# Patient Record
Sex: MALE | Race: WHITE | NOT HISPANIC OR LATINO | URBAN - METROPOLITAN AREA
[De-identification: names, ages, dates, MRNs, and addresses within clinical notes are randomized per-mention and may not be internally consistent; named-entity substitution may affect disease eponyms.]

---

## 2017-12-11 ENCOUNTER — IMPORTED ENCOUNTER (OUTPATIENT)
Dept: URBAN - METROPOLITAN AREA CLINIC 75 | Facility: CLINIC | Age: 82
End: 2017-12-11

## 2017-12-11 PROCEDURE — 92015 DETERMINE REFRACTIVE STATE: CPT

## 2017-12-11 PROCEDURE — 92014 COMPRE OPH EXAM EST PT 1/>: CPT

## 2019-12-03 ENCOUNTER — IMPORTED ENCOUNTER (OUTPATIENT)
Dept: URBAN - METROPOLITAN AREA CLINIC 75 | Facility: CLINIC | Age: 84
End: 2019-12-03

## 2019-12-03 PROCEDURE — 92015 DETERMINE REFRACTIVE STATE: CPT

## 2019-12-03 PROCEDURE — 92014 COMPRE OPH EXAM EST PT 1/>: CPT

## 2020-11-11 ENCOUNTER — APPOINTMENT (RX ONLY)
Dept: URBAN - METROPOLITAN AREA CLINIC 43 | Facility: CLINIC | Age: 85
Setting detail: DERMATOLOGY
End: 2020-11-11

## 2020-11-11 VITALS — TEMPERATURE: 97.6 F

## 2020-11-11 DIAGNOSIS — D18.0 HEMANGIOMA: ICD-10-CM

## 2020-11-11 DIAGNOSIS — L57.0 ACTINIC KERATOSIS: ICD-10-CM

## 2020-11-11 DIAGNOSIS — L82.1 OTHER SEBORRHEIC KERATOSIS: ICD-10-CM

## 2020-11-11 PROBLEM — D18.01 HEMANGIOMA OF SKIN AND SUBCUTANEOUS TISSUE: Status: ACTIVE | Noted: 2020-11-11

## 2020-11-11 PROCEDURE — ? COUNSELING

## 2020-11-11 PROCEDURE — 17004 DESTROY PREMAL LESIONS 15/>: CPT

## 2020-11-11 PROCEDURE — 99213 OFFICE O/P EST LOW 20 MIN: CPT | Mod: 25

## 2020-11-11 PROCEDURE — ? LIQUID NITROGEN

## 2020-11-11 ASSESSMENT — LOCATION DETAILED DESCRIPTION DERM
LOCATION DETAILED: INFERIOR THORACIC SPINE
LOCATION DETAILED: RIGHT SUPERIOR LATERAL MALAR CHEEK
LOCATION DETAILED: RIGHT DISTAL DORSAL FOREARM
LOCATION DETAILED: LEFT CENTRAL MALAR CHEEK
LOCATION DETAILED: RIGHT INFERIOR TEMPLE
LOCATION DETAILED: RIGHT SUPERIOR FOREHEAD
LOCATION DETAILED: RIGHT CENTRAL FRONTAL SCALP
LOCATION DETAILED: LEFT LATERAL FOREHEAD
LOCATION DETAILED: LEFT LATERAL MALAR CHEEK
LOCATION DETAILED: STERNUM
LOCATION DETAILED: RIGHT PROXIMAL DORSAL FOREARM
LOCATION DETAILED: LEFT INFERIOR LATERAL FOREHEAD
LOCATION DETAILED: LEFT SUPERIOR TEMPLE
LOCATION DETAILED: LEFT SUPERIOR FOREHEAD
LOCATION DETAILED: LEFT FOREHEAD
LOCATION DETAILED: XIPHOID
LOCATION DETAILED: RIGHT INFERIOR LATERAL MALAR CHEEK
LOCATION DETAILED: RIGHT LATERAL MALAR CHEEK
LOCATION DETAILED: INFERIOR MID FOREHEAD
LOCATION DETAILED: RIGHT SUPERIOR LATERAL BUCCAL CHEEK
LOCATION DETAILED: LEFT SUPERIOR MEDIAL FOREHEAD
LOCATION DETAILED: RIGHT INFERIOR LATERAL FOREHEAD
LOCATION DETAILED: RIGHT LATERAL FOREHEAD
LOCATION DETAILED: RIGHT INFERIOR CENTRAL MALAR CHEEK
LOCATION DETAILED: RIGHT FOREHEAD

## 2020-11-11 ASSESSMENT — LOCATION SIMPLE DESCRIPTION DERM
LOCATION SIMPLE: RIGHT SCALP
LOCATION SIMPLE: INFERIOR FOREHEAD
LOCATION SIMPLE: CHEST
LOCATION SIMPLE: RIGHT TEMPLE
LOCATION SIMPLE: LEFT TEMPLE
LOCATION SIMPLE: RIGHT FOREHEAD
LOCATION SIMPLE: LEFT CHEEK
LOCATION SIMPLE: LEFT FOREHEAD
LOCATION SIMPLE: RIGHT FOREARM
LOCATION SIMPLE: ABDOMEN
LOCATION SIMPLE: RIGHT CHEEK
LOCATION SIMPLE: UPPER BACK

## 2020-11-11 ASSESSMENT — LOCATION ZONE DERM
LOCATION ZONE: TRUNK
LOCATION ZONE: SCALP
LOCATION ZONE: FACE
LOCATION ZONE: ARM

## 2020-11-11 NOTE — PROCEDURE: LIQUID NITROGEN
Duration Of Freeze Thaw-Cycle (Seconds): 3
Consent: The patient's consent was obtained including but not limited to risks of crusting, scabbing, blistering, scarring, darker or lighter pigmentary change, recurrence, incomplete removal and infection.
Post-Care Instructions: I reviewed with the patient in detail post-care instructions. Patient is to wear sunprotection, and avoid picking at any of the treated lesions. Pt may apply Vaseline to crusted or scabbing areas.
Detail Level: Detailed
Number Of Freeze-Thaw Cycles: 2 freeze-thaw cycles
Render Post-Care Instructions In Note?: no

## 2020-11-11 NOTE — PROCEDURE: MIPS QUALITY
Detail Level: Detailed
Quality 110: Preventive Care And Screening: Influenza Immunization: Influenza Immunization Administered during Influenza season
Additional Notes: COVID-19 screening questions \\nPlan: MIPS Quality.\\nWaiting for pending test results for COVID19? No\\nHave you been in close contact with anyone who has COVID19? No\\nDo you have symptoms including fever, cough, difficulty breathing, muscle aches/soreness, sore throat, or\\ndiarrhea? No\\nHave you traveled outside the [place] or to any of the following states ([place], [place], [place], [place], [place], [place]) in the last 14 days?\\nNo\\nIs there any reason you cannot wear a face mask that covers your nose and mouth for your entire visit? No

## 2021-03-17 ENCOUNTER — IMPORTED ENCOUNTER (OUTPATIENT)
Dept: URBAN - METROPOLITAN AREA CLINIC 75 | Facility: CLINIC | Age: 86
End: 2021-03-17

## 2021-03-17 PROCEDURE — 92133 CPTRZD OPH DX IMG PST SGM ON: CPT

## 2021-03-17 PROCEDURE — 92014 COMPRE OPH EXAM EST PT 1/>: CPT

## 2021-03-17 PROCEDURE — 92015 DETERMINE REFRACTIVE STATE: CPT

## 2021-12-09 ENCOUNTER — PREPPED CHART (OUTPATIENT)
Dept: URBAN - METROPOLITAN AREA CLINIC 64 | Facility: CLINIC | Age: 86
End: 2021-12-09

## 2021-12-09 PROBLEM — H35.3132 DRY AMD, INTERMEDIATE DRY STAGE: Status: STABILIZING | Noted: 2021-12-09

## 2021-12-09 PROBLEM — D31.32 CHOROIDAL NEVUS: Status: STABILIZING | Noted: 2021-12-09

## 2021-12-09 PROBLEM — H35.3132 DRY AMD, INTERMEDIATE DRY STAGE: Noted: 2021-12-09

## 2021-12-09 PROBLEM — H47.10 HISTORY OF PAPILLEDEMA: Noted: 2021-12-09

## 2021-12-09 PROBLEM — H43.813 POSTERIOR VITREOUS DETACHMENT: Noted: 2021-12-09

## 2021-12-09 PROBLEM — H47.10 PAPILLEDEMA, UNSPECIFIED: Noted: 2021-12-09

## 2021-12-09 PROBLEM — H43.813 POSTERIOR VITREOUS DETACHMENT: Status: STABILIZING | Noted: 2021-12-09

## 2021-12-09 PROBLEM — H35.81 RETINAL EDEMA: Noted: 2021-12-09

## 2021-12-09 PROBLEM — D31.32 CHOROIDAL NEVUS: Noted: 2021-12-09

## 2022-03-01 ENCOUNTER — IMPORTED ENCOUNTER (OUTPATIENT)
Dept: URBAN - METROPOLITAN AREA CLINIC 75 | Facility: CLINIC | Age: 87
End: 2022-03-01

## 2022-03-01 PROCEDURE — 99214 OFFICE O/P EST MOD 30 MIN: CPT

## 2022-03-01 PROCEDURE — 92015 DETERMINE REFRACTIVE STATE: CPT

## 2022-09-19 ASSESSMENT — VISUAL ACUITY
OS_CC: 20/70-2
OD_CC: 20/30-3

## 2022-09-19 ASSESSMENT — TONOMETRY
OS_IOP_MMHG: 13
OD_IOP_MMHG: 12

## 2022-12-14 ENCOUNTER — FOLLOW UP (OUTPATIENT)
Dept: URBAN - METROPOLITAN AREA CLINIC 64 | Facility: CLINIC | Age: 87
End: 2022-12-14

## 2022-12-14 DIAGNOSIS — D31.32: ICD-10-CM

## 2022-12-14 DIAGNOSIS — H35.3132: ICD-10-CM

## 2022-12-14 DIAGNOSIS — H53.8: ICD-10-CM

## 2022-12-14 DIAGNOSIS — Z96.1: ICD-10-CM

## 2022-12-14 DIAGNOSIS — H43.813: ICD-10-CM

## 2022-12-14 DIAGNOSIS — H47.10: ICD-10-CM

## 2022-12-14 PROCEDURE — 92134 CPTRZ OPH DX IMG PST SGM RTA: CPT

## 2022-12-14 PROCEDURE — 76512 OPH US DX B-SCAN: CPT

## 2022-12-14 PROCEDURE — 92014 COMPRE OPH EXAM EST PT 1/>: CPT

## 2022-12-14 PROCEDURE — 92202 OPSCPY EXTND ON/MAC DRAW: CPT

## 2022-12-14 ASSESSMENT — VISUAL ACUITY
OS_CC: 20/200+1
OS_PH: 20/100+1
OD_PH: 20/25-2
OD_CC: 20/30+2

## 2022-12-14 ASSESSMENT — TONOMETRY
OS_IOP_MMHG: 12
OD_IOP_MMHG: 11

## 2023-03-13 ENCOUNTER — COMPLETE EYE EXAM (OUTPATIENT)
Dept: URBAN - METROPOLITAN AREA CLINIC 42 | Facility: CLINIC | Age: 88
End: 2023-03-13

## 2023-03-13 DIAGNOSIS — D31.32: ICD-10-CM

## 2023-03-13 DIAGNOSIS — H53.8: ICD-10-CM

## 2023-03-13 DIAGNOSIS — H35.3132: ICD-10-CM

## 2023-03-13 DIAGNOSIS — H47.10: ICD-10-CM

## 2023-03-13 DIAGNOSIS — H43.813: ICD-10-CM

## 2023-03-13 DIAGNOSIS — Z96.1: ICD-10-CM

## 2023-03-13 PROCEDURE — 92014 COMPRE OPH EXAM EST PT 1/>: CPT

## 2023-03-13 ASSESSMENT — VISUAL ACUITY
OS_CC: 20/30-1
OD_CC: 20/25-2

## 2023-03-13 ASSESSMENT — TONOMETRY
OS_IOP_MMHG: 12
OD_IOP_MMHG: 16

## 2023-10-22 ASSESSMENT — TONOMETRY
OD_IOP_MMHG: 11
OS_IOP_MMHG: 13
OS_IOP_MMHG: 10
OS_IOP_MMHG: 11
OD_IOP_MMHG: 8
OD_IOP_MMHG: 10
OD_IOP_MMHG: 11
OS_IOP_MMHG: 10

## 2023-10-22 ASSESSMENT — VISUAL ACUITY
OD_CC: 20/20
OS_CC: 20/60 -1
OD_CC: 20/25 +1
OS_CC: 20/50 +2
OD_CC: 20/25
OD_CC: 20/30 +2
OS_CC: 20/20 -2
OS_CC: 20/40